# Patient Record
Sex: FEMALE | Race: OTHER | HISPANIC OR LATINO | Employment: FULL TIME | URBAN - METROPOLITAN AREA
[De-identification: names, ages, dates, MRNs, and addresses within clinical notes are randomized per-mention and may not be internally consistent; named-entity substitution may affect disease eponyms.]

---

## 2017-06-05 ENCOUNTER — APPOINTMENT (OUTPATIENT)
Dept: LAB | Facility: HOSPITAL | Age: 22
End: 2017-06-05

## 2017-06-05 ENCOUNTER — TRANSCRIBE ORDERS (OUTPATIENT)
Dept: LAB | Facility: HOSPITAL | Age: 22
End: 2017-06-05

## 2017-06-05 DIAGNOSIS — Z32.01 PREGNANCY EXAMINATION OR TEST, POSITIVE RESULT: Primary | ICD-10-CM

## 2017-06-05 DIAGNOSIS — Z32.01 PREGNANCY EXAMINATION OR TEST, POSITIVE RESULT: ICD-10-CM

## 2017-06-05 LAB — B-HCG SERPL-ACNC: ABNORMAL MIU/ML

## 2017-06-05 PROCEDURE — 84702 CHORIONIC GONADOTROPIN TEST: CPT

## 2017-06-05 PROCEDURE — 36415 COLL VENOUS BLD VENIPUNCTURE: CPT

## 2017-09-07 ENCOUNTER — TRANSCRIBE ORDERS (OUTPATIENT)
Dept: LAB | Facility: HOSPITAL | Age: 22
End: 2017-09-07

## 2017-09-07 ENCOUNTER — APPOINTMENT (OUTPATIENT)
Dept: LAB | Facility: HOSPITAL | Age: 22
End: 2017-09-07
Payer: COMMERCIAL

## 2017-09-07 DIAGNOSIS — Z32.01 PREGNANCY EXAMINATION OR TEST, POSITIVE RESULT: ICD-10-CM

## 2017-09-07 DIAGNOSIS — Z32.01 PREGNANCY EXAMINATION OR TEST, POSITIVE RESULT: Primary | ICD-10-CM

## 2017-09-07 LAB — B-HCG SERPL-ACNC: 81 MIU/ML

## 2017-09-07 PROCEDURE — 84702 CHORIONIC GONADOTROPIN TEST: CPT

## 2017-09-07 PROCEDURE — 36415 COLL VENOUS BLD VENIPUNCTURE: CPT

## 2018-01-16 NOTE — PROGRESS NOTES
Patient Health Assessment    Date:            04/05/2016  Blood Pressure:  105/55  Pulse:           53  Age:             20  Weight:          0 lbs  Height/Length:   5' 2"  Body Mass Index: 0 0  Provider:        [email or web address]  Clinic:          [facility]:  Medications: Amoxicillin 500mg    Vicodin 5mg/300mg    ibuprofen 400 mg tablets  Allergies:  Since Last Visit: Medical Alert: No Change    Medications: No Change    Allergies:        No Change  Pain Scale Type: Numeric Pain ScalePain Level: 0  Description:    Pt presented for recall and prophy  Completed clinical exam and data  recorded  Pt still hadn't gotten to [facility] for extraction of  #30  Pt reports no pain  Advised pt again to go and get the tooth extracted  before infection and pain starts  Pt agreed, gave pt a new referral for [facility]  Cleaned teeth using cavitron and hand scalers  Lots of bleeding and calculus  present  Gave oral hygiene instructions on brushing and flossing  Polished  teeth with prophy paste and brush  Flossed teeth  Pt left in  good health  NV: fillings    ALONSO Villela/Dr Bowser    ----- Signed on Tuesday, April 05, 2016 at 4:24:34 PM  -----  ----- Provider: [email or web address] - Resident Two, Dentist -- Clinic: [facility] -----

## 2018-02-17 ENCOUNTER — HOSPITAL ENCOUNTER (EMERGENCY)
Facility: HOSPITAL | Age: 23
Discharge: HOME | End: 2018-02-17
Attending: EMERGENCY MEDICINE | Admitting: EMERGENCY MEDICINE
Payer: COMMERCIAL

## 2018-02-17 VITALS
SYSTOLIC BLOOD PRESSURE: 117 MMHG | DIASTOLIC BLOOD PRESSURE: 59 MMHG | WEIGHT: 219 LBS | OXYGEN SATURATION: 99 % | BODY MASS INDEX: 40.06 KG/M2 | HEART RATE: 76 BPM | RESPIRATION RATE: 16 BRPM | TEMPERATURE: 98.5 F

## 2018-02-17 DIAGNOSIS — J02.9 SORE THROAT: Primary | ICD-10-CM

## 2018-02-17 PROCEDURE — 99282 EMERGENCY DEPT VISIT SF MDM: CPT

## 2018-02-17 RX ORDER — ACETAMINOPHEN 500 MG
500 TABLET ORAL EVERY 6 HOURS PRN
Qty: 30 TABLET | Refills: 0 | Status: SHIPPED | OUTPATIENT
Start: 2018-02-17

## 2018-02-17 RX ORDER — IBUPROFEN 600 MG/1
600 TABLET ORAL ONCE
Status: COMPLETED | OUTPATIENT
Start: 2018-02-17 | End: 2018-02-17

## 2018-02-17 RX ORDER — IBUPROFEN 400 MG/1
400 TABLET ORAL EVERY 6 HOURS PRN
Qty: 30 TABLET | Refills: 0 | Status: SHIPPED | OUTPATIENT
Start: 2018-02-17

## 2018-02-17 RX ORDER — ACETAMINOPHEN 325 MG/1
975 TABLET ORAL ONCE
Status: COMPLETED | OUTPATIENT
Start: 2018-02-17 | End: 2018-02-17

## 2018-02-17 RX ADMIN — IBUPROFEN 600 MG: 600 TABLET, FILM COATED ORAL at 07:12

## 2018-02-17 RX ADMIN — LIDOCAINE HYDROCHLORIDE 15 ML: 20 SOLUTION ORAL; TOPICAL at 07:12

## 2018-02-17 RX ADMIN — DEXAMETHASONE SODIUM PHOSPHATE 10 MG: 10 INJECTION, SOLUTION INTRAMUSCULAR; INTRAVENOUS at 07:12

## 2018-02-17 RX ADMIN — ACETAMINOPHEN 975 MG: 325 TABLET, FILM COATED ORAL at 07:12

## 2018-02-17 NOTE — ED ATTENDING ATTESTATION
I, [address], DO, saw and evaluated the patient  I have discussed the patient with the resident/non-physician practitioner and agree with the resident's/non-physician practitioner's findings, Plan of Care, and MDM as documented in the resident's/non-physician practitioner's note, except where noted  All available labs and Radiology studies were reviewed  At this point I agree with the current assessment done in the Emergency Department  I have conducted an independent evaluation of this patient a history and physical is as follows:    62-year-old female presents with sore throat for 3 days  Patient complains of pain with swelling but denies difficulty swallowing, no change in voice, no fevers or chills  Denies cough  On exam-no acute distress, heart regular, lungs clear, mild pharyngeal erythema without asymmetric swelling or obvious exudates, palpable tender anterior cervical lymphadenopathy    Plan-Decadron, symptomatic treatment, return precautions given    Critical Care Time  CritCare Time    Procedures

## 2018-02-17 NOTE — ED PROVIDER NOTES
History  Chief Complaint   Patient presents with    Sore Throat     Pt  co that her throat is sore and it hurts to swallow  HPI    41-year-old female no significant past medical history presents with complaints of sore throat for the last 3 days  Patient says she does have a pain in the back of her throat has been getting progressively worse  She now has pain upon swallowing with liquids as well as solids  However patient says she is still able to eat as well as drink  She denies any changes in voice she is handling her secretions no hot potato voice  Patient denies any history of diabetes or immunocompromise she is not on any medications  Twelve systems reviewed otherwise negative except as stated in HPI  Patient says she is is supposed to work today does not feel like she is up for it secondary to her sore throat  Impression and plan 41-year-old female no significant past medical history presents with sore throat for last 3 days  She has no tonsillar exudates she has bilateral anterior cervical lymphadenopathy  She is phonating normally handling her secretions  We will treat symptoms with Tylenol, Motrin, Decadron viscous lidocaine, write for work note and discharge  Strict return precautions discussed  None       History reviewed  No pertinent past medical history  History reviewed  No pertinent surgical history  History reviewed  No pertinent family history  I have reviewed and agree with the history as documented  Social History   Substance Use Topics    Smoking status: Never Smoker    Smokeless tobacco: Never Used    Alcohol use No        Review of Systems   Constitutional: Negative for chills and fever  HENT: Positive for sore throat  Negative for trouble swallowing and voice change  Eyes: Negative for photophobia  Respiratory: Positive for cough  Negative for shortness of breath  Cardiovascular: Negative for chest pain, palpitations and leg swelling  Gastrointestinal: Negative for abdominal pain, diarrhea, nausea and vomiting  Endocrine: Negative for polyuria  Genitourinary: Negative for dysuria, vaginal discharge and vaginal pain  Musculoskeletal: Negative for back pain  Skin: Negative for rash  Allergic/Immunologic: Negative  Neurological: Negative for dizziness, tremors, seizures, syncope, facial asymmetry, speech difficulty, weakness, light-headedness, numbness and headaches  Hematological: Negative for adenopathy  Psychiatric/Behavioral: Negative for agitation  Physical Exam  ED Triage Vitals [02/17/18 0556]   Temperature Pulse Respirations Blood Pressure SpO2   98 5 °F (36 9 °C) 76 16 117/59 99 %      Temp Source Heart Rate Source Patient Position - Orthostatic VS BP Location FiO2 (%)   Oral Monitor Sitting Left arm --      Pain Score       8           Orthostatic Vital Signs  Vitals:    02/17/18 0556   BP: 117/59   Pulse: 76   Patient Position - Orthostatic VS: Sitting       Physical Exam   Constitutional: She is oriented to person, place, and time  She appears well-developed and well-nourished  No distress  HENT:   Head: Normocephalic and atraumatic  Right Ear: No hemotympanum  Left Ear: No hemotympanum  Nose: Nose normal  No nasal septal hematoma  Mouth/Throat: Uvula is midline, oropharynx is clear and moist and mucous membranes are normal  She does not have dentures  No oropharyngeal exudate  Normal phonation  Uvula midline  Handling secretions  No tonsillar swelling/exudates   Eyes: Conjunctivae are normal  Right eye exhibits no nystagmus  Left eye exhibits no nystagmus  Neck: Trachea normal, normal range of motion, full passive range of motion without pain and phonation normal  Neck supple  No tracheal tenderness present  No tracheal deviation present  No c-spine tenderness  bl anterior cervical adenopathy   Cardiovascular: Normal rate, regular rhythm, normal heart sounds and intact distal pulses    Exam reveals no gallop and no friction rub  No murmur heard  Pulmonary/Chest: Effort normal and breath sounds normal  No respiratory distress  She has no wheezes  She has no rales  She exhibits no tenderness  Abdominal: Soft  Bowel sounds are normal  She exhibits no distension and no mass  There is no tenderness  There is no rebound and no guarding  No hernia  Musculoskeletal: Normal range of motion  She exhibits no edema, tenderness or deformity  Neurological: She is alert and oriented to person, place, and time  She has normal strength  No cranial nerve deficit or sensory deficit  GCS eye subscore is 4  GCS verbal subscore is 5  GCS motor subscore is 6  Reflex Scores:       Patellar reflexes are 2+ on the right side and 2+ on the left side  Achilles reflexes are 2+ on the right side and 2+ on the left side  Skin: Skin is warm and dry  Capillary refill takes less than 2 seconds  No rash noted  She is not diaphoretic  Psychiatric: She has a normal mood and affect  Nursing note and vitals reviewed        ED Medications  Medications   lidocaine viscous (XYLOCAINE) 2 % mucosal solution 15 mL (15 mL Swish & Spit Given 2/17/18 0479)   acetaminophen (TYLENOL) tablet 975 mg (975 mg Oral Given 2/17/18 5427)   ibuprofen (MOTRIN) tablet 600 mg (600 mg Oral Given 2/17/18 6012)   dexamethasone 10 mg/mL oral liquid 10 mg 1 mL (10 mg Oral Given 2/17/18 5758)       Diagnostic Studies  Results Reviewed     None                 No orders to display         Procedures  Procedures      Phone Consults  ED Phone Contact    ED Course  ED Course                                MDM  CritCare Time    Disposition  Final diagnoses:   Sore throat     Time reflects when diagnosis was documented in both MDM as applicable and the Disposition within this note     Time User Action Codes Description Comment    2/17/2018  6:49 AM Carla RDZ Add [J02 9] Sore throat       ED Disposition     ED Disposition Condition Comment Discharge  Dillingham Renzo discharge to home/self care  Condition at discharge: Good        Follow-up Information     Follow up With Specialties Details Why Contact Info Additional Information    Sharmaine Payan MD Family Medicine In 2 days  [address]  [address] [place] [address] Emergency Department Emergency Medicine  If symptoms worsen [address]  [phone]  ED, [address] [place] [address], [facility]        There are no discharge medications for this patient  No discharge procedures on file  ED Provider  Attending physically available and evaluated Dillingham Renzo  I managed the patient along with the ED Attending      Electronically Signed by         Liza Carty MD  02/17/18 2028

## 2018-02-17 NOTE — DISCHARGE INSTRUCTIONS

## 2019-03-20 ENCOUNTER — TRANSCRIBE ORDERS (OUTPATIENT)
Dept: ADMINISTRATIVE | Facility: HOSPITAL | Age: 24
End: 2019-03-20

## 2019-03-20 DIAGNOSIS — N92.0 MENORRHAGIA WITH REGULAR CYCLE: Primary | ICD-10-CM

## 2019-03-25 ENCOUNTER — HOSPITAL ENCOUNTER (OUTPATIENT)
Dept: RADIOLOGY | Facility: HOSPITAL | Age: 24
Discharge: HOME | End: 2019-03-25
Payer: COMMERCIAL

## 2019-03-25 DIAGNOSIS — N92.0 MENORRHAGIA WITH REGULAR CYCLE: ICD-10-CM

## 2019-03-25 PROCEDURE — 76830 TRANSVAGINAL US NON-OB: CPT

## 2019-03-25 PROCEDURE — 76856 US EXAM PELVIC COMPLETE: CPT

## 2021-05-27 ENCOUNTER — IMMUNIZATIONS (OUTPATIENT)
Dept: FAMILY MEDICINE CLINIC | Facility: HOSPITAL | Age: 26
End: 2021-05-27

## 2021-05-27 DIAGNOSIS — Z23 ENCOUNTER FOR IMMUNIZATION: Primary | ICD-10-CM

## 2021-05-27 PROCEDURE — 0001A SARS-COV-2 / COVID-19 MRNA VACCINE (PFIZER-BIONTECH) 30 MCG: CPT

## 2021-05-27 PROCEDURE — 91300 SARS-COV-2 / COVID-19 MRNA VACCINE (PFIZER-BIONTECH) 30 MCG: CPT

## 2021-06-19 ENCOUNTER — IMMUNIZATIONS (OUTPATIENT)
Dept: FAMILY MEDICINE CLINIC | Facility: HOSPITAL | Age: 26
End: 2021-06-19

## 2021-06-19 DIAGNOSIS — Z23 ENCOUNTER FOR IMMUNIZATION: Primary | ICD-10-CM

## 2021-06-19 PROCEDURE — 91300 SARS-COV-2 / COVID-19 MRNA VACCINE (PFIZER-BIONTECH) 30 MCG: CPT

## 2021-06-19 PROCEDURE — 0002A SARS-COV-2 / COVID-19 MRNA VACCINE (PFIZER-BIONTECH) 30 MCG: CPT

## 2022-09-05 ENCOUNTER — HOSPITAL ENCOUNTER (EMERGENCY)
Facility: HOSPITAL | Age: 27
Discharge: HOME | End: 2022-09-05
Attending: EMERGENCY MEDICINE

## 2022-09-05 ENCOUNTER — APPOINTMENT (OUTPATIENT)
Dept: RADIOLOGY | Facility: HOSPITAL | Age: 27
End: 2022-09-05

## 2022-09-05 VITALS
DIASTOLIC BLOOD PRESSURE: 59 MMHG | SYSTOLIC BLOOD PRESSURE: 121 MMHG | HEART RATE: 72 BPM | RESPIRATION RATE: 18 BRPM | OXYGEN SATURATION: 99 % | TEMPERATURE: 98.6 F

## 2022-09-05 DIAGNOSIS — S93.401A RIGHT ANKLE SPRAIN: Primary | ICD-10-CM

## 2022-09-05 DIAGNOSIS — M79.641 RIGHT HAND PAIN: ICD-10-CM

## 2022-09-05 PROCEDURE — 99284 EMERGENCY DEPT VISIT MOD MDM: CPT | Performed by: EMERGENCY MEDICINE

## 2022-09-05 PROCEDURE — 73610 X-RAY EXAM OF ANKLE: CPT

## 2022-09-05 PROCEDURE — 73130 X-RAY EXAM OF HAND: CPT

## 2022-09-05 PROCEDURE — 99283 EMERGENCY DEPT VISIT LOW MDM: CPT

## 2022-09-05 RX ORDER — ACETAMINOPHEN 325 MG/1
975 TABLET ORAL ONCE
Status: COMPLETED | OUTPATIENT
Start: 2022-09-05 | End: 2022-09-05

## 2022-09-05 RX ADMIN — ACETAMINOPHEN 975 MG: 325 TABLET ORAL at 10:50

## 2022-09-05 NOTE — DISCHARGE INSTRUCTIONS
Follow-up with ortho for further care  Contact info provided below  Use over the counter medications as stated on the bottle as needed for pain control   - Tylenol  - Ibuprofen  - Ice/heat  Return to the ED with new or worsening symptoms

## 2022-09-05 NOTE — ED ATTENDING ATTESTATION
9/5/2022  ILinsey DO, saw and evaluated the patient  I have discussed the patient with the resident/non-physician practitioner and agree with the resident's/non-physician practitioner's findings, Plan of Care, and MDM as documented in the resident's/non-physician practitioner's note, except where noted  All available labs and Radiology studies were reviewed  I was present for key portions of any procedure(s) performed by the resident/non-physician practitioner and I was immediately available to provide assistance  At this point I agree with the current assessment done in the Emergency Department  I have conducted an independent evaluation of this patient a history and physical is as follows:    Claudette WHITE DO, saw and evaluated the patient  I have discussed the patient with the resident and agree with the resident's findings, Plan of Care, and MDM as documented in the resident's note, except where noted  All available labs and Radiology studies were reviewed  I was present for key portions of any procedure(s) performed by the resident and I was immediately available to provide assistance  At this point I agree with the current assessment done in the Emergency Department  I have conducted an independent evaluation of this patient a history and physical is as follows:    Mildred Yee is an 33y o  year old female who presents to the ED today with R ankle pain for one day since falling when tripping on a basketball  Pain is exacerbated by movement and palpation, walking  The pain is aching in quality, does not radiate  Patient also endorses swelling in the ankle  Also having pain in the R hand from the same injury  She is R-handed  ROS otherwise negative  No head strike or LOC with fall  Took tylenol for pain  General: NAD   HEENT: normocephalic, atraumatic   MMM   CV: RRR   Pulm: normal work of breathing,   GI: abdomen non-distended   : deferred   MSK: no LE edema, no deformity  R hand examination grossly unremarkable, n/v intact  ttp base of the 1st metacarpal   ttp inferior to r lateral malleolus with some swelling but no deformity  Skin: warm and dry   Neuro: awake and alert, moves all extremities with good strength, face symmetric, speech normal   Psych: appropriate mood and affect       MDM  Initial ED diagnostics to include x-ray  Initial ddx includes but is not limited to: sprain, strain, dislocation, fx, contusion  Anticipated Disposition:  D/c with supportive care      I have personally reviewed the laboratory studies and imaging studies as ordered by the resident/EMELINA  I have personally examined the patient              ED Course         Critical Care Time  Procedures

## 2022-09-05 NOTE — Clinical Note
Mayra Rich was seen and treated in our emergency department on 9/5/2022  Diagnosis:     Patti    She may return on this date: 09/08/2022         If you have any questions or concerns, please don't hesitate to call        Ivon Gtz MD    ______________________________           _______________          _______________  Hospital Representative                              Date                                Time

## 2022-09-05 NOTE — ED PROVIDER NOTES
History  Chief Complaint   Patient presents with    Ankle Injury     Pt states that she was trying to step over a basketball in the yard yesterday when she fell on her R ankle; c/o R ankle and wring wrist/thumb pain; pt has not taken any pain medications today     Pt is a 32yo F who presents for R hand and foot pain  Pt reports that yesterday she was attempting to step over a basketball when she accidentally stepped on it with her R foot  She reports her R foot rolled and she subsequently fell, catching herself with her R hand  Pt denies head strike or LOC  Pt reporting pain to the lateral aspect of the R ankle and difficulty walking due to the pain  She is also reporting pain to the 1st metacarpal of her R hand  Pt states she is otherwise healthy with no daily meds  She did take tylenol last night for the pain but has not taken anything today  Pt is R hand dominant  Prior to Admission Medications   Prescriptions Last Dose Informant Patient Reported? Taking?   acetaminophen (TYLENOL) 500 mg tablet   No No   Sig: Take 1 tablet (500 mg total) by mouth every 6 (six) hours as needed for mild pain   ibuprofen (MOTRIN) 400 mg tablet   No No   Sig: Take 1 tablet (400 mg total) by mouth every 6 (six) hours as needed for mild pain      Facility-Administered Medications: None       No past medical history on file  No past surgical history on file  No family history on file  I have reviewed and agree with the history as documented  E-Cigarette/Vaping     E-Cigarette/Vaping Substances     Social History     Tobacco Use    Smoking status: Never Smoker    Smokeless tobacco: Never Used   Substance Use Topics    Alcohol use: No    Drug use: No        Review of Systems   Musculoskeletal:        R ankle and R hand pain   All other systems reviewed and are negative        Physical Exam  ED Triage Vitals [09/05/22 0953]   Temperature Pulse Respirations Blood Pressure SpO2   98 6 °F (37 °C) 73 18 121/59 99 % Temp Source Heart Rate Source Patient Position - Orthostatic VS BP Location FiO2 (%)   Oral Monitor Sitting Right arm --      Pain Score       8             Orthostatic Vital Signs  Vitals:    09/05/22 0953 09/05/22 1000   BP: 121/59 121/59   Pulse: 73 72   Patient Position - Orthostatic VS: Sitting        Physical Exam  Vitals reviewed  Constitutional:       General: She is not in acute distress  Appearance: She is well-developed  She is not toxic-appearing or diaphoretic  HENT:      Head: Normocephalic and atraumatic  Right Ear: External ear normal       Left Ear: External ear normal       Nose: Nose normal       Mouth/Throat:      Mouth: Mucous membranes are moist       Pharynx: Oropharynx is clear  Eyes:      Extraocular Movements: Extraocular movements intact  Pupils: Pupils are equal, round, and reactive to light  Cardiovascular:      Rate and Rhythm: Normal rate and regular rhythm  Heart sounds: Normal heart sounds  Pulmonary:      Effort: Pulmonary effort is normal  No respiratory distress  Breath sounds: Normal breath sounds  Abdominal:      General: There is no distension  Palpations: Abdomen is soft  Tenderness: There is no abdominal tenderness  Musculoskeletal:      Right hand: Tenderness (distal 1st metacarpal) present  No swelling or deformity  Normal range of motion  Normal strength  Normal sensation  Normal pulse  Left hand: Normal       Cervical back: Normal range of motion and neck supple  Right lower leg: No edema  Left lower leg: No edema  Right ankle: Swelling present  No deformity  Tenderness present over the lateral malleolus  Decreased range of motion (2/2 pain)  Normal pulse  Left ankle: Normal    Skin:     General: Skin is warm and dry  Capillary Refill: Capillary refill takes less than 2 seconds  Neurological:      Mental Status: She is alert and oriented to person, place, and time     Psychiatric: Speech: Speech normal          Behavior: Behavior is cooperative  ED Medications  Medications   acetaminophen (TYLENOL) tablet 975 mg (975 mg Oral Given 9/5/22 1050)       Diagnostic Studies  Results Reviewed     None                 XR ankle 3+ views RIGHT   Final Result by Nikolai Tomas MD (09/06 4535)      No acute osseous abnormality  Workstation performed: [id number]         XR hand 3+ views RIGHT   Final Result by Nikoali Tomas MD (09/06 7007)      No acute osseous abnormality  Workstation performed: [id number]               Procedures  Procedures      ED Course  ED Course as of 09/14/22 1210   Mon Sep 05, 2022   1122 XR ankle 3+ views RIGHT  No acute findings on wet read  1122 XR hand 3+ views RIGHT  No acute findings on wet read  MDM  Number of Diagnoses or Management Options  Right ankle sprain  Right hand pain  Diagnosis management comments: Pt is a 34yo F who presents with R ankle and R hand pain  Differential diagnosis to include but not limited to fracture vs dislocation vs soft tissue injury  Will plan for XRs and Tylenol for pain control  XRs unremarkable  See ED course for details  Pt placed in aircast and given crutches with instructions to be WBAT Pt agreeable to plan  Plan to discharge pt with f/u to ortho  Discussed returning the ED with significant worsening of symptoms  Discussed use of over the counter medications as stated on the bottle as needed for pain  Pt expressed understanding of discharge instructions, return precautions, and medication instructions  All questions were answered and pt was discharged without incident              Amount and/or Complexity of Data Reviewed  Tests in the radiology section of CPT®: ordered and reviewed        Disposition  Final diagnoses:   Right ankle sprain   Right hand pain     Time reflects when diagnosis was documented in both MDM as applicable and the Disposition within this note     Time User Action Codes Description Comment    9/5/2022 11:24 AM Cinthia Booker Add [L82 676G] Right ankle sprain     9/5/2022 11:24 AM Cinthia Booker Add [J54 436] Right hand pain       ED Disposition     ED Disposition   Discharge    Condition   Stable    Date/Time   Mon Sep 5, 2022 11:24 AM    Comment   Leontine Kocher discharge to home/self care  Follow-up Information     Follow up With Specialties Details Why Contact Info Additional Information    Sergei Perry MD Family Medicine Call  As needed [address] [place] [address]  119 [facility] 176 [facility]       30 [facility] Orthopedic Surgery Call on 9/6/2022  [address] [phone]  4304 [facility] [place], [address] [place] [address] I [address] [place] [address], [address]  [facility]  Use Entrance A           Discharge Medication List as of 9/5/2022 11:25 AM      CONTINUE these medications which have NOT CHANGED    Details   acetaminophen (TYLENOL) 500 mg tablet Take 1 tablet (500 mg total) by mouth every 6 (six) hours as needed for mild pain, Starting Sat 2/17/2018, Print      ibuprofen (MOTRIN) 400 mg tablet Take 1 tablet (400 mg total) by mouth every 6 (six) hours as needed for mild pain, Starting Sat 2/17/2018, Print           No discharge procedures on file  PDMP Review     None           ED Provider  Attending physically available and evaluated Forrestseda Kocher I managed the patient along with the ED Attending      Electronically Signed by         Kristy Shirley MD  09/14/22 4242

## 2022-09-09 ENCOUNTER — OFFICE VISIT (OUTPATIENT)
Dept: OBGYN CLINIC | Facility: HOSPITAL | Age: 27
End: 2022-09-09

## 2022-09-09 VITALS
WEIGHT: 263.6 LBS | SYSTOLIC BLOOD PRESSURE: 117 MMHG | HEIGHT: 62 IN | BODY MASS INDEX: 48.51 KG/M2 | HEART RATE: 96 BPM | DIASTOLIC BLOOD PRESSURE: 82 MMHG

## 2022-09-09 DIAGNOSIS — S93.401A SPRAIN OF UNSPECIFIED LIGAMENT OF RIGHT ANKLE, INITIAL ENCOUNTER: Primary | ICD-10-CM

## 2022-09-09 PROCEDURE — 99203 OFFICE O/P NEW LOW 30 MIN: CPT | Performed by: PHYSICIAN ASSISTANT

## 2022-09-09 NOTE — PROGRESS NOTES
Assessment:    Right ankle sprain, mild      Plan:    Continue wearing Aircast with ambulation until symptoms resolved   Rest, ice to the area, and elevation to reduce swelling  Over-the-counter anti-inflammatory medications recommended   Ankle range of motion exercises encouraged and demonstrated   Can follow-up on an as-needed basis   She can call for referral to physical therapy if needed in the future  Subjective:     HPI    Patient ID:  Denver Shah is a 33 y o  female presenting for evaluation of the right ankle  According to the patient, earlier this week she was in the backyard and she had a inversion twisting injury of her right ankle and felt immediate pain and swelling well localized to the lateral ankle  She presented to the ER where she was found have no acute fracture  She was referred here  Today, the patient states she has been wearing the Aircast and her pain is slightly better however she the swelling persists  She denies any associated numbness and tingling  She denies any medial based ankle pain  No history of surgery to the right ankle  The following portions of the patient's history were reviewed and updated as appropriate: allergies, current medications, past family history, past medical history, past social history, past surgical history and problem list     Review of Systems     Objective:    Imaging:  Right ankle x-ray 9/5/2022  VIEWS:  XR ANKLE 3+ VW RIGHT   Images: 3     FINDINGS:     There is no acute fracture or dislocation      No significant degenerative changes      No lytic or blastic osseous lesion      There is soft tissue swelling over the lateral malleolus      IMPRESSION:     No acute osseous abnormality  Physical Exam     Vitals:    09/09/22 0952   BP: 117/82   Pulse: 96       Orthopedic Examination:  Right ankle     Inspection:  No open wounds or erythema  There is lateral based swelling  No ecchymosis      Palpation: Tenderness to palpation lateral malleolus, ATFL, CFL   The Achilles tendon is nontender without gapping  There is no medial based tenderness  No forefoot tenderness  The Lisfranc region is nontender      Range-of-motion:+ ankle DF/PF, slightly limited due to pain and swelling    Strength:  Deferred    Sensation:  Intact    Special Tests:  Negative Monet test

## 2022-09-09 NOTE — LETTER
September 9, 2022     Patient: Radha Dudley  YOB: 1995  Date of Visit: 9/9/2022      To Whom it May Concern:    Radha Dudley was seen in my office on 9/9/2022  [place] is able to return to work without restrictions 9/13/2022  If you have any questions or concerns, please don't hesitate to call           Sincerely,          Katie Clark PA-C        CC: No Recipients

## 2022-09-12 ENCOUNTER — TELEPHONE (OUTPATIENT)
Dept: OBGYN CLINIC | Facility: HOSPITAL | Age: 27
End: 2022-09-12

## 2022-09-12 NOTE — TELEPHONE ENCOUNTER
Judy Funez  RE: Return to work  CB: [phone]    Patient called stating she had an appt with Teagan Ramsey last week and was provided a work note  Patient is to return to work tomorrow but she was told if she doesn't feel ready to return to work to call back and she can be kept out of work another week  Caller asked if a new work letter can be put in [address] [place] [address] her from work for another wee?

## 2022-12-06 ENCOUNTER — OFFICE VISIT (OUTPATIENT)
Dept: SLEEP CENTER | Facility: CLINIC | Age: 27
End: 2022-12-06

## 2022-12-06 VITALS
BODY MASS INDEX: 48.76 KG/M2 | SYSTOLIC BLOOD PRESSURE: 115 MMHG | HEART RATE: 70 BPM | HEIGHT: 62 IN | WEIGHT: 265 LBS | DIASTOLIC BLOOD PRESSURE: 66 MMHG

## 2022-12-06 DIAGNOSIS — E66.01 MORBID OBESITY WITH BMI OF 45.0-49.9, ADULT (HCC): Primary | ICD-10-CM

## 2022-12-06 DIAGNOSIS — R06.81 APNEA: ICD-10-CM

## 2022-12-06 PROBLEM — E66.9 OBESITY: Status: ACTIVE | Noted: 2022-12-06

## 2022-12-06 NOTE — ASSESSMENT & PLAN NOTE
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

## 2022-12-06 NOTE — PROGRESS NOTES
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  She presented for her physical, and the medical examiner requested that she have a sleep study performed in order to maintain her DOT/CDL  She states she does not have any concerns about her sleep  She denies snoring, gasping, choking or witnessed apneic episodes  She sleeps well through the night and does not wake up  She states she feels rested in the morning  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  She denies any drowsy driving accidents, or close calls  She is willing to have a sleep study performed and treat her sleep apnea if necessary  Comorbid conditions:  None      Review of Systems      Genitourinary none   Cardiology none   Gastrointestinal none   Neurology none   Constitutional none   Integumentary none   Psychiatry none   Musculoskeletal none   Pulmonary none   ENT none   Endocrine none   Hematological none       Sleep Study Results:  No prior studies     CPAP Equipment:  Never used    Employment:  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 [place], no drowsy driving, no close calls, CDL for 1 year 2 months, will       Sleep Schedule:       Bedtime:  8:30 p m  on workdays, days off 10-10:30      Latency:  Within 30 minutes       Wakeup time:  5 a m  workdays and days off 8-9 a m       Awakenings:       Frequency:  None Daytime Sleepiness / Inappropriate Sleep:       Most severe:  None       Naps :  None        Inappropriate drowsiness / sleep:  None     Snoring:  None, no gasping or choking     Apnea:  None noted     Change in Weight:  Gained 25 pounds over last year     Restless Leg Syndrome:  No clinical symptoms consistent with this diagnosis     Other Complaints:  No reports of sleep walking, sleep talking, sleep paralysis or hallucinations surrounding sleep  Denies waking up with headaches, bruxism, and dry mouth  Social History:      Caffeine:  Rarely not daily        Tobacco:   reports that she has never smoked  She has never used smokeless tobacco      E-cig/Vaping:    E-Cigarette/Vaping      E-Cigarette/Vaping Substances         Alcohol:   reports no history of alcohol use  Drugs:   reports no history of drug use  The review of systems and following portions of the patient's history were reviewed and updated as appropriate: allergies, current medications, past family history, past medical history, past social history, past surgical history and problem list         Objective:       Vitals:    12/06/22 1400   BP: 115/66   BP Location: Left arm   Patient Position: Sitting   Cuff Size: Large   Pulse: 70   Weight: 120 kg (265 lb)   Height: 5' 2" (1 575 m)     Body mass index is 48 47 kg/m²  Neck Circumference: 14 25  Boykin Sleepiness Scale:  Total score: 0      Current Outpatient Medications:   •  acetaminophen (TYLENOL) 500 mg tablet, Take 1 tablet (500 mg total) by mouth every 6 (six) hours as needed for mild pain (Patient not taking: Reported on 12/6/2022), Disp: 30 tablet, Rfl: 0  •  ibuprofen (MOTRIN) 400 mg tablet, Take 1 tablet (400 mg total) by mouth every 6 (six) hours as needed for mild pain (Patient not taking: Reported on 12/6/2022), Disp: 30 tablet, Rfl: 0    Physical Exam  General Appearance:   Alert, cooperative, no distress, appears stated age, obese     Head:   Normocephalic, without obvious abnormality, atraumatic     Eyes:   PERRL, conjunctiva/corneas clear          Nose:  Nares normal, septum midline, mucosa normal, no drainage or sinus tenderness           Throat:  Lips, teeth and gums normal; tongue large in size with scalloping and midline in position; mucosa moist, uvula long, narrow airway, tonsils +2, Mallampati class 4     Neck:  Supple, symmetrical, trachea midline, no adenopathy; no thyromegaly noted, no carotid bruit or JVD     Lungs:      Clear to auscultation bilaterally, respirations unlabored     Heart:   Regular rate and rhythm, S1 and S2 normal, no murmur, rub or gallop       Extremities:  Extremities normal, atraumatic, no cyanosis or edema       Skin:  Skin color, texture, turgor normal, no rashes or lesions       Neurologic:  No focal deficits noted  ASSESSMENT / PLAN     1  Morbid obesity with BMI of 45 0-49 9, adult [facility])  Assessment & Plan:  The patient is morbidly obese with a BMI of 48  She has a current CDL [employer] and needs to have a sleep study performed prior to her renewal which is due February 28  Her risk factors for sleep apnea include morbid obesity and Mallampati 4 on examination  She denies snoring, apneic episodes or excessive daytime sleepiness  She is currently driving a truck and denies any drowsy driving  She will be scheduled for a home sleep study  If she has found to have sleep apnea, she will be treated with CPAP  If she does not have sleep apnea, she may follow-up as needed since she does not have any specific sleep concerns  2  Apnea  -     Ambulatory Referral to Sleep Medicine  -     Home Study; Future; Expected date: 12/06/2022         Counseling / Coordination of Care  Total clinic time spent today 50 minutes in chart review, face-to-face time spent with the patient, coordination of care and documentation      A description of the counseling / coordination of care:     diagnostic results, instructions for management, risk factor reductions, prognosis, patient and family education, impressions, risks and benefits of treatment options and importance of compliance with treatment    The following instructions have been given to the patient today:    Patient Instructions   Please get your home study done as soon as possible  If you have sleep apnea, CPAP will be ordered for you  If you do not have sleep apnea, no follow up needed  Nursing Support:  When: Monday through Friday 7A-5PM except holidays  Where: Our direct line is [phone]  If you are having a true emergency please call 911  In the event that the line is busy or it is after hours please leave a voice message and we will return your call  Please speak clearly, leaving your full name, birth date, best number to reach you and the reason for your call  Medication refills: We will need the name of the medication, the dosage, the ordering provider, whether you get a 30 or 90 day refill, and the pharmacy name and address  Medications will be ordered by the provider only  Nurses cannot call in prescriptions  Please allow 7 days for medication refills  Physician requested updates: If your provider requested that you call with an update after starting medication, please be ready to provide us the medication and dosage, what time you take your medication, the time you attempt to fall asleep, time you fall asleep, when you wake up, and what time you get out of bed  Sleep Study Results: We will contact you with sleep study results and/or next steps after the physician has reviewed your testing               ANNE López 16

## 2022-12-06 NOTE — PATIENT INSTRUCTIONS
Please get your home study done as soon as possible  If you have sleep apnea, CPAP will be ordered for you  If you do not have sleep apnea, no follow up needed  Nursing Support:  When: Monday through Friday 7A-5PM except holidays  Where: Our direct line is [phone]  If you are having a true emergency please call 911  In the event that the line is busy or it is after hours please leave a voice message and we will return your call  Please speak clearly, leaving your full name, birth date, best number to reach you and the reason for your call  Medication refills: We will need the name of the medication, the dosage, the ordering provider, whether you get a 30 or 90 day refill, and the pharmacy name and address  Medications will be ordered by the provider only  Nurses cannot call in prescriptions  Please allow 7 days for medication refills  Physician requested updates: If your provider requested that you call with an update after starting medication, please be ready to provide us the medication and dosage, what time you take your medication, the time you attempt to fall asleep, time you fall asleep, when you wake up, and what time you get out of bed  Sleep Study Results: We will contact you with sleep study results and/or next steps after the physician has reviewed your testing

## 2022-12-08 ENCOUNTER — HOSPITAL ENCOUNTER (OUTPATIENT)
Dept: SLEEP CENTER | Facility: CLINIC | Age: 27
Discharge: HOME | End: 2022-12-08

## 2022-12-08 DIAGNOSIS — R06.81 APNEA: ICD-10-CM

## 2022-12-10 NOTE — PROGRESS NOTES
Home Sleep Study Documentation    HOME STUDY DEVICE: Noxturnal yes                                           Silvia G3 no      Pre-Sleep Home Study:    Set-up and instructions performed by: Checked in by Marie Servin     Technician performed demonstration for Patient: yes    Return demonstration performed by Patient: yes    Written instructions provided to Patient: yes    Patient signed consent form: yes    ---Progress noted filled out by scoring tech  Post-Sleep Home Study:    Additional comments by Patient: pending    Home Sleep Study Failed:no:    Failure reason: N/A    Reported or Detected: N/A     Scored by: ALCON Serna

## 2022-12-19 ENCOUNTER — TELEPHONE (OUTPATIENT)
Dept: SLEEP CENTER | Facility: CLINIC | Age: 27
End: 2022-12-19

## 2022-12-19 NOTE — TELEPHONE ENCOUNTER
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